# Patient Record
Sex: MALE | URBAN - METROPOLITAN AREA
[De-identification: names, ages, dates, MRNs, and addresses within clinical notes are randomized per-mention and may not be internally consistent; named-entity substitution may affect disease eponyms.]

---

## 2022-02-19 ENCOUNTER — NURSE TRIAGE (OUTPATIENT)
Dept: ADMINISTRATIVE | Facility: CLINIC | Age: 32
End: 2022-02-19

## 2022-02-19 NOTE — TELEPHONE ENCOUNTER
Kvng calling from his home in [place].  was seen in [facility] there yesterday for ear pain.  He was prescribed antibiotic for left ear redness.  Has taken 3 doses so far.  ear is red on outside of ear, and is swollen. Does not feel feverish, but  ear very swollen.  Recommend he be seen within 4 hours; encouraged return to clinic.  Says he will not go back due to cost.  No pcp. Care advice given, with recommendation to seek care for worsening symptoms.  Called him back to discuss OAC as option, but he declines and states he has decided that he will be going back to the [facility] this morning, and his roommate will be driving him.  No additional concerns at this time.     Reason for Disposition   [1] SEVERE pain and [2] not improved 2 hours after analgesic medication (e.g., ibuprofen or acetaminophen)    Additional Information   Negative: [1] Stiff neck (can't touch chin to chest) AND [2] fever   Negative: [1] Bony area of skull behind the ear is pink or swollen AND [2] fever   Negative: Fever > 104 F (40 C)   Negative: Patient sounds very sick or weak to the triager    Protocols used: EAR - OTITIS MEDIA FOLLOW-UP CALL-A-

## 2022-02-23 ENCOUNTER — NURSE TRIAGE (OUTPATIENT)
Dept: ADMINISTRATIVE | Facility: CLINIC | Age: 32
End: 2022-02-23

## 2022-02-23 NOTE — TELEPHONE ENCOUNTER
Patient calling back to discuss otitis media/externa.  He has been on clindamycin and prednisone for 4 days now, the pain is somewhat better, but the auricle is still red and swollen and there are scabs on the inside of his ear.  No significant improvement.  I advised he follow up with a physician within 24 hours, he verbalized understanding.  Reason for Disposition   [1] Recently diagnosed with otitis media AND [2] currently taking oral antibiotics (pills)   [1] Taking antibiotic > 72 hours (3 days) and [2] pain persists or recurs    Additional Information   Negative: [1] Swimmer's ear suspected BUT [2] not taking antibiotics (ear drops or pills)   Negative: [1] Stiff neck (can't touch chin to chest) AND [2] fever   Negative: [1] Bony area of skull behind the ear is pink or swollen AND [2] fever   Negative: Fever > 104 F (40 C)   Negative: Patient sounds very sick or weak to the triager   Negative: [1] SEVERE pain and [2] not improved 2 hours after analgesic medication (e.g., ibuprofen or acetaminophen)   Negative: Walking is very unsteady or feels very dizzy   Negative: [1] Vomited AND [2] 3 or more times    Protocols used: EAR - OTITIS EXTERNA FOLLOW-UP CALL-A-AH, EAR - OTITIS MEDIA FOLLOW-UP CALL-A-AH